# Patient Record
Sex: MALE | Race: BLACK OR AFRICAN AMERICAN | NOT HISPANIC OR LATINO | Employment: OTHER | URBAN - METROPOLITAN AREA
[De-identification: names, ages, dates, MRNs, and addresses within clinical notes are randomized per-mention and may not be internally consistent; named-entity substitution may affect disease eponyms.]

---

## 2022-05-06 ENCOUNTER — NEW PATIENT (OUTPATIENT)
Dept: URBAN - METROPOLITAN AREA CLINIC 6 | Facility: CLINIC | Age: 52
End: 2022-05-06

## 2022-05-06 DIAGNOSIS — H00.14: ICD-10-CM

## 2022-05-06 PROCEDURE — 92002 INTRM OPH EXAM NEW PATIENT: CPT

## 2022-05-06 ASSESSMENT — VISUAL ACUITY
OS_SC: 20/30
OD_PH: 20/25
OD_SC: 20/40
OS_PH: 20/25
OU_SC: 20/30

## 2022-05-06 ASSESSMENT — TONOMETRY
OS_IOP_MMHG: 16
OD_IOP_MMHG: 15

## 2022-05-31 ENCOUNTER — FOLLOW UP (OUTPATIENT)
Dept: URBAN - METROPOLITAN AREA CLINIC 6 | Facility: CLINIC | Age: 52
End: 2022-05-31

## 2022-05-31 DIAGNOSIS — H00.14: ICD-10-CM

## 2022-05-31 PROCEDURE — 92012 INTRM OPH EXAM EST PATIENT: CPT

## 2022-05-31 PROCEDURE — 67800 REMOVE EYELID LESION: CPT

## 2022-05-31 ASSESSMENT — VISUAL ACUITY
OD_SC: 20/30
OS_SC: 20/30-2

## 2022-05-31 ASSESSMENT — TONOMETRY: OD_IOP_MMHG: 15

## 2024-08-01 NOTE — PROCEDURE NOTE: CLINICAL
PROCEDURE NOTE: Excision Chalazion, Single #1 Left Upper Lid. Diagnosis: Chalazion. Anesthesia: Subconjunctival Lidocaine. Prep: Betadine Scrub. Prior to treatment, the risks/benefits/alternatives were discussed. The patient wished to proceed with procedure. After the risks, benefits, and alternatives to the procedure were discussed with the patient, informed consent was obtained. The patient, the procedure, and the correct site were identified beforehand. Local anesthesia was applied and the lid was prepped. The lid was clamped exposing the posterior surface on the eyelid. The chalazion was incised and removed with a curette. Bleeding was controlled and the clamp was removed. The eye was pressure patched with antibiotic ointment. The patient tolerated the procedure well and left the room in good condition. There were no complications. Post procedure instructions given. Kevin Carlton Opt out